# Patient Record
Sex: FEMALE | Race: BLACK OR AFRICAN AMERICAN | NOT HISPANIC OR LATINO
[De-identification: names, ages, dates, MRNs, and addresses within clinical notes are randomized per-mention and may not be internally consistent; named-entity substitution may affect disease eponyms.]

---

## 2019-07-13 ENCOUNTER — TRANSCRIPTION ENCOUNTER (OUTPATIENT)
Age: 24
End: 2019-07-13

## 2019-10-03 ENCOUNTER — TRANSCRIPTION ENCOUNTER (OUTPATIENT)
Age: 24
End: 2019-10-03

## 2019-11-12 ENCOUNTER — TRANSCRIPTION ENCOUNTER (OUTPATIENT)
Age: 24
End: 2019-11-12

## 2019-11-14 PROBLEM — Z00.00 ENCOUNTER FOR PREVENTIVE HEALTH EXAMINATION: Status: ACTIVE | Noted: 2019-11-14

## 2019-11-20 ENCOUNTER — EMERGENCY (EMERGENCY)
Facility: HOSPITAL | Age: 24
LOS: 1 days | Discharge: HOME | End: 2019-11-20
Attending: EMERGENCY MEDICINE
Payer: MEDICAID

## 2019-11-20 VITALS
DIASTOLIC BLOOD PRESSURE: 76 MMHG | TEMPERATURE: 98 F | RESPIRATION RATE: 18 BRPM | HEART RATE: 99 BPM | WEIGHT: 179.9 LBS | OXYGEN SATURATION: 100 % | SYSTOLIC BLOOD PRESSURE: 131 MMHG

## 2019-11-20 PROCEDURE — 96372 THER/PROPH/DIAG INJ SC/IM: CPT

## 2019-11-20 PROCEDURE — 99283 EMERGENCY DEPT VISIT LOW MDM: CPT

## 2019-11-20 PROCEDURE — 99283 EMERGENCY DEPT VISIT LOW MDM: CPT | Mod: 25

## 2019-11-20 RX ORDER — KETOROLAC TROMETHAMINE 30 MG/ML
30 SYRINGE (ML) INJECTION ONCE
Refills: 0 | Status: DISCONTINUED | OUTPATIENT
Start: 2019-11-20 | End: 2019-11-20

## 2019-11-20 RX ORDER — METHOCARBAMOL 500 MG/1
750 TABLET, FILM COATED ORAL ONCE
Refills: 0 | Status: COMPLETED | OUTPATIENT
Start: 2019-11-20 | End: 2019-11-20

## 2019-11-20 RX ORDER — METHOCARBAMOL 500 MG/1
1 TABLET, FILM COATED ORAL
Qty: 12 | Refills: 0
Start: 2019-11-20 | End: 2019-11-23

## 2019-11-20 RX ADMIN — METHOCARBAMOL 750 MILLIGRAM(S): 500 TABLET, FILM COATED ORAL at 11:29

## 2019-11-20 RX ADMIN — Medication 30 MILLIGRAM(S): at 11:29

## 2019-11-20 NOTE — ED PROVIDER NOTE - ATTENDING CONTRIBUTION TO CARE
I, Azael Youngblood, have personally performed a face to face diagnostic evaluation on this patient. I have reviewed the ACP note and agree with the history, exam and plan of care, except as noted.    23 yo F hx of left knee pain, f/u with ortho, pending MRI, report having worsening left knee pain. no trauma. she had xray done prior that showed no fracture. She has mild tenderness to left knee. full ROM. 2+ periphearl pulse. sensation intact. patient offered xray but likely no new injury and agreeable not to get xray. Toradol and robaxin given. patient placed in knee immobilizer for support and outpatient follow up with her ortho.

## 2019-11-20 NOTE — ED PROVIDER NOTE - OBJECTIVE STATEMENT
24 year old female with no PMHx presents to the ED for L knee pain x 3 weeks. Denies any falls, trauma, twisting. Pt has already presented to an orthopedist and had a negative x ray. Pt has a knee MRI in the beginning of December. States she feels like her knee " gives out". States she cannot wait until December for her MRI. Has taken Motrin/ Tylenol for pain with minimal relief. Pt is able to ambulate without difficulty.   Also reports she feels a "wheeze" when she twists, denies SOB, cough or CP.

## 2019-11-20 NOTE — ED PROVIDER NOTE - PHYSICAL EXAMINATION
awake, alert, comfortable appearing   Lungs CTA, = chest rise and fall, no wheezing, speaking in full sentences, no accessory muscle use, no nasal flaring   L knee with no patellar TTP, b/l knee TTP, able to flex, extend and ambulate   A/O x 3, no focal neuro deficits

## 2019-11-20 NOTE — ED PROVIDER NOTE - CLINICAL SUMMARY MEDICAL DECISION MAKING FREE TEXT BOX
24 year old female with no PMHx presents to the ED for L knee pain x 3 weeks. Pt already seen by ortho and had neg xray, and has MRI scheduled for next month. Offered to re- x ray knee and advised pt that MRI is not done in the ED for nonemergencies. Lungs CTA, no respiratory distress. D/c with PMD and ortho f/u.

## 2019-11-20 NOTE — ED ADULT TRIAGE NOTE - CHIEF COMPLAINT QUOTE
Pt states "my knee keeps buckling and I have this weird wheeze in my chest", pt c/o left knee pain, no resp distress noted, pt ambulates without difficulty

## 2019-11-20 NOTE — ED PROVIDER NOTE - PATIENT PORTAL LINK FT
You can access the FollowMyHealth Patient Portal offered by [facility] by registering at the following website: [email or web address]. By joining [employer]’s FollowMyHealth portal, you will also be able to view your health information using other applications (apps) compatible with our system.

## 2020-01-08 ENCOUNTER — TRANSCRIPTION ENCOUNTER (OUTPATIENT)
Age: 25
End: 2020-01-08

## 2020-01-09 ENCOUNTER — OTHER (OUTPATIENT)
Age: 25
End: 2020-01-09

## 2020-01-09 DIAGNOSIS — M25.569 PAIN IN UNSPECIFIED KNEE: ICD-10-CM

## 2020-01-16 ENCOUNTER — APPOINTMENT (OUTPATIENT)
Dept: ORTHOPEDIC SURGERY | Facility: CLINIC | Age: 25
End: 2020-01-16

## 2021-05-20 ENCOUNTER — TRANSCRIPTION ENCOUNTER (OUTPATIENT)
Age: 26
End: 2021-05-20